# Patient Record
Sex: FEMALE | Race: WHITE | NOT HISPANIC OR LATINO | Employment: FULL TIME | URBAN - NONMETROPOLITAN AREA
[De-identification: names, ages, dates, MRNs, and addresses within clinical notes are randomized per-mention and may not be internally consistent; named-entity substitution may affect disease eponyms.]

---

## 2023-02-24 ENCOUNTER — TRANSFERRED RECORDS (OUTPATIENT)
Dept: HEALTH INFORMATION MANAGEMENT | Facility: OTHER | Age: 58
End: 2023-02-24

## 2024-04-02 ENCOUNTER — TRANSFERRED RECORDS (OUTPATIENT)
Dept: HEALTH INFORMATION MANAGEMENT | Facility: OTHER | Age: 59
End: 2024-04-02
Payer: COMMERCIAL

## 2024-04-16 ENCOUNTER — TRANSFERRED RECORDS (OUTPATIENT)
Dept: HEALTH INFORMATION MANAGEMENT | Facility: OTHER | Age: 59
End: 2024-04-16
Payer: COMMERCIAL

## 2024-04-19 ENCOUNTER — TRANSFERRED RECORDS (OUTPATIENT)
Dept: HEALTH INFORMATION MANAGEMENT | Facility: OTHER | Age: 59
End: 2024-04-19
Payer: COMMERCIAL

## 2024-04-25 ENCOUNTER — OFFICE VISIT (OUTPATIENT)
Dept: SURGERY | Facility: OTHER | Age: 59
End: 2024-04-25
Attending: SURGERY
Payer: COMMERCIAL

## 2024-04-25 VITALS
TEMPERATURE: 96.9 F | WEIGHT: 195 LBS | RESPIRATION RATE: 16 BRPM | HEART RATE: 92 BPM | DIASTOLIC BLOOD PRESSURE: 94 MMHG | HEIGHT: 64 IN | OXYGEN SATURATION: 97 % | SYSTOLIC BLOOD PRESSURE: 157 MMHG | BODY MASS INDEX: 33.29 KG/M2

## 2024-04-25 DIAGNOSIS — Z17.1 MALIGNANT NEOPLASM OF LOWER-INNER QUADRANT OF LEFT BREAST IN FEMALE, ESTROGEN RECEPTOR NEGATIVE (H): Primary | ICD-10-CM

## 2024-04-25 DIAGNOSIS — C50.312 MALIGNANT NEOPLASM OF LOWER-INNER QUADRANT OF LEFT BREAST IN FEMALE, ESTROGEN RECEPTOR NEGATIVE (H): Primary | ICD-10-CM

## 2024-04-25 PROCEDURE — 99417 PROLNG OP E/M EACH 15 MIN: CPT | Performed by: SURGERY

## 2024-04-25 PROCEDURE — 99205 OFFICE O/P NEW HI 60 MIN: CPT | Performed by: SURGERY

## 2024-04-25 ASSESSMENT — PAIN SCALES - GENERAL: PAINLEVEL: NO PAIN (0)

## 2024-04-25 NOTE — NURSING NOTE
"Chief Complaint   Patient presents with    New Patient    Consult       Initial BP (!) 157/94 (BP Location: Right arm, Patient Position: Sitting, Cuff Size: Adult Regular)   Pulse 92   Temp 96.9  F (36.1  C) (Temporal)   Resp 16   Ht 1.626 m (5' 4\")   Wt 88.5 kg (195 lb)   SpO2 97%   BMI 33.47 kg/m   Estimated body mass index is 33.47 kg/m  as calculated from the following:    Height as of this encounter: 1.626 m (5' 4\").    Weight as of this encounter: 88.5 kg (195 lb).  Medication Review: complete    The next two questions are to help us understand your food security.  If you are feeling you need any assistance in this area, we have resources available to support you today.          4/25/2024   SDOH- Food Insecurity   Within the past 12 months, did you worry that your food would run out before you got money to buy more? N   Within the past 12 months, did the food you bought just not last and you didn t have money to get more? N         Health Care Directive:  Patient does not have a Health Care Directive or Living Will: Discussed advance care planning with patient; however, patient declined at this time.    Juvenal Oneill      "

## 2024-04-25 NOTE — PROGRESS NOTES
INITIAL BREAST SURGICAL CONSULTATION    Reason for Consult:  New diagnosis left breast invasive breast cancer                     History of Present Illness:  Kaetlin is a 49 year old woman who was otherwise asymptomatic without any palpable masses, overlying skin changes, nipple discharge or other worrisome findings when she underwent screening mammogram on 24.  In the background of heterogenously dense tissue she is found to have a focal asymmetry in the inner left breast approximately 6 cm from the nipple.  There were no findings on the right.  Diagnostic imaging was recommended and performed on 24.  This confirmed a 2.1 cm irregular mass at the 9 o'clock position in the left breast.  There is a few small nonspecific calcifications within this mass.  Ultrasound shows moderate-sized lesion, no additional lesions, no axillary lymphadenopathy.  Breast MRI was completed and shows no evidence of lymphadenopathy and a 2 cm tumor.    I have reviewed all breast imaging and pathology reports at length.    Prior breast history is notable for a left breast lumpectomy for a plugged duct in .  She notes that she has an inverted nipple on the left that initially was intermittently inverted but since that surgery has been chronically inverted.  He had no other prior breast surgery or prior breast biopsies.    No recent medical changes    Breast Cancer Risk Assessment:   3 para 2  Age at first birth: 22  Menarche at age 13  Menopause at age 48  HRT: Less than 2 years  Breast feeding: Not for the first child, yes for the second child  Benign breast biopsy: Yes in  as described    Family History  Family history is notable and that she has 1 daughter and 1 son, she has 1 sister, 4 maternal aunts.  There is no family history of breast, ovarian, gastric, or pancreatic malignancy.  Her maternal uncle had a history of colon cancer.    PAST MEDICAL HISTORY: History reviewed. No pertinent past medical  "history.    PAST SURGICAL HISTORY: History reviewed. No pertinent surgical history.    FAMILY HISTORY: History reviewed. No pertinent family history.    SOCIAL HISTORY:   Social History     Tobacco Use    Smoking status: Never    Smokeless tobacco: Never   Substance Use Topics    Alcohol use: Not on file        Current Medications    No current outpatient medications on file.     No current facility-administered medications for this visit.       Allergies   No Known Allergies    Review of Systems  Constitutional:  Negative for weight loss, weight gain, fever, chills, fatigue.  Respiratory:  Negative for cough, shortness of breath, dyspnea.  Cardiovascular:  Negative for  chest pain, palpitations, syncope.  Gastrointestinal:  Negative for nausea, vomiting, changes in bowel habits, abdominal pain, jaundice.  Integument/Breast:  Negative for other skin changes aside from those noted in the HPI related to the breast.  Hematologic/lymphatic:  Negative for easy bruising, bleeding, history of bleeding disorders, lymphadenopathy.  Psychiatric:  Negative for depression or sleep disorders  Endocrine:  Negative for  heat or cold intolerances    Physical Exam  Vitals:  BP (!) 157/94 (BP Location: Right arm, Patient Position: Sitting, Cuff Size: Adult Regular)   Pulse 92   Temp 96.9  F (36.1  C) (Temporal)   Resp 16   Ht 1.626 m (5' 4\")   Wt 88.5 kg (195 lb)   SpO2 97%   BMI 33.47 kg/m      GENERAL: Well appearing  HEENT: NCAT, EOMI, sclera anicteric  CHEST: breathing non labored and even  RIGHT BREAST: no well defined masses or lesions, no skin changes, no nipple discharge, no nipple retraction  LEFT BREAST: Palpable hematoma in the medial inferior aspect of the breast at the 9 o'clock position.  No overlying skin changes.  Nipple is chronically inverted.  No skin changes, no nipple discharge, no nipple retraction.   LYMPH: no axillary, supraclavicular, infraclavicular, or cervical lymphadenopathy bilaterally  ABDOMEN: " soft, non-tender, non-distended, no masses  BACK: no spinal tenderness  EXTREMITIES: warm, well perfused, normal range of motion upper extremities, normal gait    Imaging  Reviewed as per HPI    Pathology  Reviewed as per HPI    Assessment:  Katelin is a 58-year-old woman with a screening identified 2 cm mass in her left breast.  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  Hormone receptor staining is ER negative/AL negative and HER2 is pending.  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     MDM  We reviewed all available breast imaging and pathology together today.  We used this to discuss invasive versus in situ disease.  We reviewed grading and staging of breast cancer.  We discussed tumor markers as well as pathologic and prognostic considerations with these.      We had a long discussion about she would be a more appropriate candidate for surgery upfront versus neoadjuvant chemotherapy.  I have discussed her with several medical oncologists and she is added on to present at (several) multidisciplinary forums to better understand how each decision could impact her overall treatment plan.    We discussed the role of surgery in breast cancer, discussing breast conservation versus mastectomy. We discussed equivalent surgical outcomes with respect to survival of wide local excision to negative margin followed by radiation to the breast tissue left behind versus mastectomy.  We briefly discussed options for post mastectomy reconstruction including flat closure, implant based reconstruction, or tissue based reconstruction and partnership with plastic surgery.  We discussed risk of local recurrence.  We discussed risks of wound infection, need for additional excision, flap necrosis, skin healing complications.  We discussed the lack of data to support prophylactic mastectomy for survival benefit except for in some women with specific genetic mutations.    We discussed the role of axillary hayde sampling via a  sentinel lymph node biopsy or axillary lymph node dissection if needed. We discussed the risks including bleeding, damage to adjacent structures including nerves, and the risks of lymphedema.  We discussed pre/post operative PT involvement to for lymphedema education and prevention.    I did  her regarding genetic testing today.  We discussed the limitations and potential benefits.  She does meet NCCN criteria for testing.  A three generation pedigree was completed and genetic germline mutation testing was sent stat as it will impact her pending surgical decision making.     We briefly discussed the roles of chemotherapy/endocrine therapy and radiation therapy. We discussed the recommendation for radiation therapy after lumpectomy to achieve the same local recurrence rates as a mastectomy.     Plan:  - await [facility] review of slides including Ki67 as androgen testing if they are able/agree  - await HER2  - await consensus from multidisciplinary boards for surgery up front vs neoadjuvant chemo   - she will consider breast conservation vs mastectomy and let me know asap if she would like to meet with plastic surgery     I answered all of the patient s questions in detail, and she is happy with our care and plan. The patient knows that if she has any worsening signs or symptoms, she should call to return and see us sooner. I spent 90 minutes in direct face-to-face time with the patient during this visit, and more than 50% of this time was spent in counseling and coordination of care. In addition, I spent 36 minutes of indirect care on the day of the encounter.    Thank you for allowing me to participate in Katelin's care.  Please do not hesitate to be in touch with any questions, concerns, or other considerations.    Lian Nicole MD  Phone: [phone]  Fax: [phone]

## 2024-04-26 NOTE — PROGRESS NOTES
INITIAL BREAST SURGICAL CONSULTATION    Reason for Consult:  New diagnosis left breast invasive breast cancer                     History of Present Illness:  Katelin is a 49 year old woman who was otherwise asymptomatic without any palpable masses, overlying skin changes, nipple discharge or other worrisome findings when she underwent screening mammogram on 24.  In the background of heterogenously dense tissue she is found to have a focal asymmetry in the inner left breast approximately 6 cm from the nipple.  There were no findings on the right.  Diagnostic imaging was recommended and performed on 24.  This confirmed a 2.1 cm irregular mass at the 9 o'clock position in the left breast.  There is a few small nonspecific calcifications within this mass.  Ultrasound shows moderate-sized lesion, no additional lesions, no axillary lymphadenopathy.  Breast MRI was completed and shows no evidence of lymphadenopathy and a 2 cm tumor.    I have reviewed all breast imaging and pathology reports at length.    Prior breast history is notable for a left breast lumpectomy for a plugged duct in .  She notes that she has an inverted nipple on the left that initially was intermittently inverted but since that surgery has been chronically inverted.  He had no other prior breast surgery or prior breast biopsies.    No recent medical changes    Breast Cancer Risk Assessment:   3 para 2  Age at first birth: 22  Menarche at age 13  Menopause at age 48  HRT: Less than 2 years  Breast feeding: Not for the first child, yes for the second child  Benign breast biopsy: Yes in  as described    Family History  Family history is notable and that she has 1 daughter and 1 son, she has 1 sister, 4 maternal aunts.  There is no family history of breast, ovarian, gastric, or pancreatic malignancy.  Her maternal uncle had a history of colon cancer.    PAST MEDICAL HISTORY: History reviewed. No pertinent past medical  "history.    PAST SURGICAL HISTORY: History reviewed. No pertinent surgical history.    FAMILY HISTORY: History reviewed. No pertinent family history.    SOCIAL HISTORY:   Social History     Tobacco Use    Smoking status: Never    Smokeless tobacco: Never   Substance Use Topics    Alcohol use: Not on file        Current Medications    No current outpatient medications on file.     No current facility-administered medications for this visit.       Allergies   No Known Allergies    Review of Systems  Constitutional:  Negative for weight loss, weight gain, fever, chills, fatigue.  Respiratory:  Negative for cough, shortness of breath, dyspnea.  Cardiovascular:  Negative for  chest pain, palpitations, syncope.  Gastrointestinal:  Negative for nausea, vomiting, changes in bowel habits, abdominal pain, jaundice.  Integument/Breast:  Negative for other skin changes aside from those noted in the HPI related to the breast.  Hematologic/lymphatic:  Negative for easy bruising, bleeding, history of bleeding disorders, lymphadenopathy.  Psychiatric:  Negative for depression or sleep disorders  Endocrine:  Negative for  heat or cold intolerances    Physical Exam  Vitals:  BP (!) 157/94 (BP Location: Right arm, Patient Position: Sitting, Cuff Size: Adult Regular)   Pulse 92   Temp 96.9  F (36.1  C) (Temporal)   Resp 16   Ht 1.626 m (5' 4\")   Wt 88.5 kg (195 lb)   SpO2 97%   BMI 33.47 kg/m      GENERAL: Well appearing  HEENT: NCAT, EOMI, sclera anicteric  CHEST: breathing non labored and even  RIGHT BREAST: no well defined masses or lesions, no skin changes, no nipple discharge, no nipple retraction  LEFT BREAST: Palpable hematoma in the medial inferior aspect of the breast at the 9 o'clock position.  No overlying skin changes.  Nipple is chronically inverted.  No skin changes, no nipple discharge, no nipple retraction.   LYMPH: no axillary, supraclavicular, infraclavicular, or cervical lymphadenopathy bilaterally  ABDOMEN: " soft, non-tender, non-distended, no masses  BACK: no spinal tenderness  EXTREMITIES: warm, well perfused, normal range of motion upper extremities, normal gait    Imaging  Reviewed as per HPI    Pathology  Reviewed as per HPI    Assessment:  Katelin is a 58-year-old woman with a screening identified 2 cm mass in her left breast.  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  Hormone receptor staining is ER negative/MA negative and HER2 is pending.  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     MDM  We reviewed all available breast imaging and pathology together today.  We used this to discuss invasive versus in situ disease.  We reviewed grading and staging of breast cancer.  We discussed tumor markers as well as pathologic and prognostic considerations with these.      We had a long discussion about she would be a more appropriate candidate for surgery upfront versus neoadjuvant chemotherapy.  I have discussed her with several medical oncologists and she is added on to present at (several) multidisciplinary forums to better understand how each decision could impact her overall treatment plan.    We discussed the role of surgery in breast cancer, discussing breast conservation versus mastectomy. We discussed equivalent surgical outcomes with respect to survival of wide local excision to negative margin followed by radiation to the breast tissue left behind versus mastectomy.  We briefly discussed options for post mastectomy reconstruction including flat closure, implant based reconstruction, or tissue based reconstruction and partnership with plastic surgery.  We discussed risk of local recurrence.  We discussed risks of wound infection, need for additional excision, flap necrosis, skin healing complications.  We discussed the lack of data to support prophylactic mastectomy for survival benefit except for in some women with specific genetic mutations.    We discussed the role of axillary hayde sampling via a  sentinel lymph node biopsy or axillary lymph node dissection if needed. We discussed the risks including bleeding, damage to adjacent structures including nerves, and the risks of lymphedema.  We discussed pre/post operative PT involvement to for lymphedema education and prevention.    I did  her regarding genetic testing today.  We discussed the limitations and potential benefits.  She does meet NCCN criteria for testing.  A three generation pedigree was completed and genetic germline mutation testing was sent stat as it will impact her pending surgical decision making.     We briefly discussed the roles of chemotherapy/endocrine therapy and radiation therapy. We discussed the recommendation for radiation therapy after lumpectomy to achieve the same local recurrence rates as a mastectomy.     Plan:  - await [facility] review of slides including Ki67 as androgen testing if they are able/agree  - await HER2  - await consensus from multidisciplinary boards for surgery up front vs neoadjuvant chemo   - she will consider breast conservation vs mastectomy and let me know asap if she would like to meet with plastic surgery     I answered all of the patient s questions in detail, and she is happy with our care and plan. The patient knows that if she has any worsening signs or symptoms, she should call to return and see us sooner. I spent 90 minutes in direct face-to-face time with the patient during this visit, and more than 50% of this time was spent in counseling and coordination of care. In addition, I spent 36 minutes of indirect care on the day of the encounter.    Thank you for allowing me to participate in Katelin's care.  Please do not hesitate to be in touch with any questions, concerns, or other considerations.    Lian Nicole MD  Phone: [phone]  Fax: [phone]

## 2024-07-29 ENCOUNTER — TRANSFERRED RECORDS (OUTPATIENT)
Dept: HEALTH INFORMATION MANAGEMENT | Facility: OTHER | Age: 59
End: 2024-07-29
Payer: COMMERCIAL

## 2024-08-01 ENCOUNTER — OFFICE VISIT (OUTPATIENT)
Dept: SURGERY | Facility: OTHER | Age: 59
End: 2024-08-01
Attending: SURGERY
Payer: COMMERCIAL

## 2024-08-01 VITALS
OXYGEN SATURATION: 98 % | RESPIRATION RATE: 18 BRPM | HEIGHT: 64 IN | SYSTOLIC BLOOD PRESSURE: 145 MMHG | WEIGHT: 205 LBS | DIASTOLIC BLOOD PRESSURE: 101 MMHG | TEMPERATURE: 97 F | BODY MASS INDEX: 35 KG/M2 | HEART RATE: 90 BPM

## 2024-08-01 DIAGNOSIS — C50.312 MALIGNANT NEOPLASM OF LOWER-INNER QUADRANT OF LEFT BREAST IN FEMALE, ESTROGEN RECEPTOR NEGATIVE (H): Primary | ICD-10-CM

## 2024-08-01 DIAGNOSIS — Z17.1 MALIGNANT NEOPLASM OF LOWER-INNER QUADRANT OF LEFT BREAST IN FEMALE, ESTROGEN RECEPTOR NEGATIVE (H): Primary | ICD-10-CM

## 2024-08-01 PROCEDURE — 99417 PROLNG OP E/M EACH 15 MIN: CPT | Performed by: SURGERY

## 2024-08-01 PROCEDURE — 99215 OFFICE O/P EST HI 40 MIN: CPT | Performed by: SURGERY

## 2024-08-01 ASSESSMENT — PAIN SCALES - GENERAL: PAINLEVEL: NO PAIN (0)

## 2024-08-01 NOTE — NURSING NOTE
"Chief Complaint   Patient presents with    Follow Up     Patient presents to the clinic today for chemo follow up and has her pet-scan results with her.   Initial BP (!) 145/101 (BP Location: Right arm, Patient Position: Sitting, Cuff Size: Adult Regular)   Pulse 90   Temp 97  F (36.1  C) (Tympanic)   Resp 18   Ht 1.626 m (5' 4\")   Wt 93 kg (205 lb)   SpO2 98%   BMI 35.19 kg/m   Estimated body mass index is 35.19 kg/m  as calculated from the following:    Height as of this encounter: 1.626 m (5' 4\").    Weight as of this encounter: 93 kg (205 lb).  Medication Review: complete    The next two questions are to help us understand your food security.  If you are feeling you need any assistance in this area, we have resources available to support you today.          4/25/2024   SDOH- Food Insecurity   Within the past 12 months, did you worry that your food would run out before you got money to buy more? N   Within the past 12 months, did the food you bought just not last and you didn t have money to get more? N            Health Care Directive:  Patient does not have a Health Care Directive or Living Will: Discussed advance care planning with patient; however, patient declined at this time.    Kristel Lyons      "

## 2024-08-01 NOTE — PROGRESS NOTES
INITIAL BREAST SURGICAL CONSULTATION    Reason for Consult:  Ongoing follow up for left breast cancer                     History of Present Illness:  Recall, indio is a 49 year old woman who was otherwise asymptomatic without any palpable masses, overlying skin changes, nipple discharge or other worrisome findings when she underwent screening mammogram on 24.  In the background of heterogenously dense tissue she is found to have a focal asymmetry in the inner left breast approximately 6 cm from the nipple.  There were no findings on the right.  Diagnostic imaging was recommended and performed on 24.  This confirmed a 2.1 cm irregular mass at the 9 o'clock position in the left breast.  There is a few small nonspecific calcifications within this mass.  Ultrasound showed moderate-sized lesion, no additional lesions, no axillary lymphadenopathy.  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  Breast MRI was completed and shows no evidence of lymphadenopathy and a 2 cm tumor.    Germline testing through [facility] was sent and resulted negative.     PET showed PET avid breast mass and also left internal mammary node.      I have reviewed all breast imaging and pathology reports at length.     Prior breast history is notable for a left breast lumpectomy for a plugged duct in .  She notes that she has an inverted nipple on the left that initially was intermittently inverted but since that surgery has been chronically inverted.  She had no other prior breast surgery or prior breast biopsies.    After multidisciplinary review (at multiple institutions) she was started on neoadjuvant chemo and has been tolerating that well.  She notes she has been tired but no other substantial side effects.  She has noted some weight gain.  She is [facility] through, scheduled to complete treatment 10/11/24.      Breast Cancer Risk Assessment:   3 para 2  Age at first birth:  "22  Menarche at age 13  Menopause at age 48  HRT: Less than 2 years  Breast feeding: Not for the first child, yes for the second child  Benign breast biopsy: Yes in 2010 as described    Family History  Family history is notable and that she has 1 daughter and 1 son, she has 1 sister, 4 maternal aunts.  There is no family history of breast, ovarian, gastric, or pancreatic malignancy.  Her maternal uncle had a history of colon cancer.    PAST MEDICAL HISTORY: History reviewed. No pertinent past medical history.    PAST SURGICAL HISTORY: History reviewed. No pertinent surgical history.    FAMILY HISTORY: History reviewed. No pertinent family history.    SOCIAL HISTORY:   Social History     Tobacco Use    Smoking status: Never    Smokeless tobacco: Never   Substance Use Topics    Alcohol use: Not on file        Current Medications    No current outpatient medications on file.     No current facility-administered medications for this visit.       Allergies   No Known Allergies    Review of Systems  Constitutional:  Negative for weight loss, weight gain, fever, chills, fatigue.  Respiratory:  Negative for cough, shortness of breath, dyspnea.  Cardiovascular:  Negative for  chest pain, palpitations, syncope.  Gastrointestinal:  Negative for nausea, vomiting, changes in bowel habits, abdominal pain, jaundice.  Integument/Breast:  Negative for other skin changes aside from those noted in the HPI related to the breast.  Hematologic/lymphatic:  Negative for easy bruising, bleeding, history of bleeding disorders, lymphadenopathy.  Psychiatric:  Negative for depression or sleep disorders  Endocrine:  Negative for  heat or cold intolerances    Physical Exam  Vitals:  BP (!) 145/101 (BP Location: Right arm, Patient Position: Sitting, Cuff Size: Adult Regular)   Pulse 90   Temp 97  F (36.1  C) (Tympanic)   Resp 18   Ht 1.626 m (5' 4\")   Wt 93 kg (205 lb)   SpO2 98%   BMI 35.19 kg/m      GENERAL: Well appearing  Remainder of " exam deferred as today was for consultation only.     Imaging  Reviewed as per HPI    Pathology  Reviewed as per HPI    Assessment:  Katelin is a 58-year-old woman with a screening identified 2 cm mass in her left breast.  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  Hormone receptor staining is ER negative/OR negative and HER2 negative.  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 She is currently undergoing neoadjuvant chemo and tolerating this well.     MDM  We reviewed her most recent PET today which shows substantial improvement in her breast mass, down from 2cm to 6mm.  There is no comment regarding internal mammary nodes on this (I don't have access to images) however there is note that there is a left axillary node that is subcentimeter that is anatomically unchanged however newly PET avid.      I reviewed this briefly with Dr. Emmanuel and he and I agree that this should be re-evaluated with US and biopsied/clipped if possible.      She will continue with neoadjuvant chemo with Dr. Figueroa.     We again discussed the role of surgery in breast cancer, discussing breast conservation versus mastectomy. We discussed equivalent surgical outcomes with respect to survival of wide local excision to negative margin followed by radiation to the breast tissue left behind versus mastectomy.  We briefly discussed options for post mastectomy reconstruction including flat closure, implant based reconstruction, or tissue based reconstruction and partnership with plastic surgery.  We discussed risk of local recurrence.      We discussed that most likely radiation will already be recommended for her given internal mammary node avidity which solidifies her preference for breast conservation.     We discussed the role of axillary hayde sampling via a sentinel lymph node biopsy and also retrieval of that clipped node (assuming that can be achieved).  We discussed pre/post operative PT involvement to for lymphedema education  and prevention.    Katelin is in agreement to proceed with lumpectomy and targeted axillary dissection when chemo is complete.  We will tentatively plan for 11/1/24 assuming all stays on track with chemo. We discussed the specifics of surgery, including what to expect day of surgery and anticipated recovery. We discussed risks including but not limited to bleeding, infection, fluid collection, hematoma, need for additional surgery, lymphedema, and anesthetic risks.    Plan:  - OR orders submitted for 11/1/24   - I will discuss with Dr. Figueroa and Dr. Emmanuel    I answered all of the patient s questions in detail, and she is happy with our care and plan. The patient knows that if she has any worsening signs or symptoms, she can call to return and see us sooner. I spent 45 minutes in direct face-to-face time with the patient during this visit, and more than 50% of this time was spent in counseling and coordination of care. In addition, I spent 26 minutes of indirect care on the day of the encounter.    Thank you for allowing me to participate in Katelin Ronquillo's care.     Lian Nicole MD  Phone: [phone]  Fax: [phone]